# Patient Record
Sex: MALE | Race: WHITE | Employment: FULL TIME | URBAN - METROPOLITAN AREA
[De-identification: names, ages, dates, MRNs, and addresses within clinical notes are randomized per-mention and may not be internally consistent; named-entity substitution may affect disease eponyms.]

---

## 2022-08-17 ENCOUNTER — TELEPHONE (OUTPATIENT)
Dept: ORTHOPEDIC SURGERY | Age: 82
End: 2022-08-17

## 2022-08-17 NOTE — TELEPHONE ENCOUNTER
notes  were received  by  fax  and  given  to Saud Rabago  and  she  will send  to  [facility]  for  review

## 2022-09-13 ENCOUNTER — OFFICE VISIT (OUTPATIENT)
Dept: CARDIOLOGY CLINIC | Age: 82
End: 2022-09-13
Payer: MEDICARE

## 2022-09-13 VITALS
BODY MASS INDEX: 26.55 KG/M2 | HEART RATE: 58 BPM | SYSTOLIC BLOOD PRESSURE: 108 MMHG | WEIGHT: 196 LBS | HEIGHT: 72 IN | DIASTOLIC BLOOD PRESSURE: 64 MMHG

## 2022-09-13 DIAGNOSIS — I25.10 CORONARY ARTERY DISEASE DUE TO LIPID RICH PLAQUE: ICD-10-CM

## 2022-09-13 DIAGNOSIS — E78.5 DYSLIPIDEMIA: ICD-10-CM

## 2022-09-13 DIAGNOSIS — I10 ESSENTIAL HYPERTENSION: Primary | ICD-10-CM

## 2022-09-13 DIAGNOSIS — I25.83 CORONARY ARTERY DISEASE DUE TO LIPID RICH PLAQUE: ICD-10-CM

## 2022-09-13 PROCEDURE — G8417 CALC BMI ABV UP PARAM F/U: HCPCS | Performed by: INTERNAL MEDICINE

## 2022-09-13 PROCEDURE — G8428 CUR MEDS NOT DOCUMENT: HCPCS | Performed by: INTERNAL MEDICINE

## 2022-09-13 PROCEDURE — 1123F ACP DISCUSS/DSCN MKR DOCD: CPT | Performed by: INTERNAL MEDICINE

## 2022-09-13 PROCEDURE — 93000 ELECTROCARDIOGRAM COMPLETE: CPT | Performed by: INTERNAL MEDICINE

## 2022-09-13 PROCEDURE — 1036F TOBACCO NON-USER: CPT | Performed by: INTERNAL MEDICINE

## 2022-09-13 PROCEDURE — 99205 OFFICE O/P NEW HI 60 MIN: CPT | Performed by: INTERNAL MEDICINE

## 2022-09-13 RX ORDER — CHLORAL HYDRATE 500 MG
CAPSULE ORAL
COMMUNITY

## 2022-09-13 RX ORDER — ROSUVASTATIN CALCIUM 20 MG/1
20 TABLET, COATED ORAL DAILY
Qty: 90 TABLET | Refills: 3 | Status: SHIPPED | OUTPATIENT
Start: 2022-09-13

## 2022-09-13 RX ORDER — LISINOPRIL 20 MG/1
20 TABLET ORAL EVERY EVENING
Qty: 90 TABLET | Refills: 3 | Status: SHIPPED | OUTPATIENT
Start: 2022-09-13

## 2022-09-13 RX ORDER — ROSUVASTATIN CALCIUM 20 MG/1
1 TABLET, COATED ORAL
COMMUNITY
Start: 2018-01-17 | End: 2022-09-13 | Stop reason: SDUPTHER

## 2022-09-13 RX ORDER — ALLOPURINOL 300 MG/1
300 TABLET ORAL DAILY
Qty: 90 TABLET | Refills: 3 | Status: SHIPPED | OUTPATIENT
Start: 2022-09-13

## 2022-09-13 RX ORDER — ALLOPURINOL 300 MG/1
1 TABLET ORAL
COMMUNITY
End: 2022-09-13 | Stop reason: SDUPTHER

## 2022-09-13 RX ORDER — LISINOPRIL 20 MG/1
20 TABLET ORAL EVERY EVENING
COMMUNITY
End: 2022-09-13 | Stop reason: SDUPTHER

## 2022-09-13 NOTE — PROGRESS NOTES
[address], [address] [place] [address], [address] [place] [address]  PHONE: [phone]    SUBJECTIVE: Dayna Melvin (1940) is a 80 y.o. male seen for a follow up visit regarding the following:   Specialty Problems    None    Per [facility] cardiology  HPI: Presents to establish care for coronary artery disease. This is evidenced by calcium score that was elevated going back to 1999. He brings his records. He recently moved to the area from [place]. He has never had a heart attack or revascularization procedure. He denies chest pain or shortness of breath. Feels like he notices some right leg swelling on occasion over the last 3 years. Denies any sleep issues. Wakes up rested. No hypersomnolence. EKG demonstrates sinus rhythm with nonspecific T wave abnormalities and late transition. He had an LDL cholesterol of 59 last year with normal LFTs. 9/13/22  Pt doing well. No chest pain. No palpitations. Patient denies syncope. No dyspnea. States they are taking meds. Maintains a normal level of activity for them without symptoms. No dizziness or lightheadedness. All above conditions stable. CCS was 94 approx 5 yrs ago. Past Medical History, Past Surgical History, Family history, Social History, and Medications were all reviewed with the patient today and updated as necessary. Allergies   Allergen Reactions    Benzocaine     Polymyxin B     Sulfa Antibiotics Rash     No past medical history on file. No past surgical history on file. No family history on file.    Social History     Tobacco Use    Smoking status: Never    Smokeless tobacco: Never   Substance Use Topics    Alcohol use: Not on file       Current Outpatient Medications:     vitamin D (CHOLECALCIFEROL) 25 MCG (1000 UT) TABS tablet, 1,000 Units, Disp: , Rfl:     Omega-3 Fatty Acids (FISH OIL) 1000 MG CAPS, Fish Oil CAPS   1400mg 1 tablet daily  Active, Disp: , Rfl:     allopurinol (ZYLOPRIM) 300 MG tablet, Take 1 tablet by mouth daily, Disp: 90 tablet, Rfl: 3    lisinopril (PRINIVIL;ZESTRIL) 20 MG tablet, Take 1 tablet by mouth every evening, Disp: 90 tablet, Rfl: 3    rosuvastatin (CRESTOR) 20 MG tablet, Take 1 tablet by mouth daily, Disp: 90 tablet, Rfl: 3    ROS:  Review of Systems - General ROS: negative for - chills, fatigue or fever  Hematological and Lymphatic ROS: negative for - bleeding problems, bruising or jaundice  Respiratory ROS: no cough, shortness of breath, or wheezing  Cardiovascular ROS: no chest pain or dyspnea on exertion  Gastrointestinal ROS: no abdominal pain, change in bowel habits, or black or bloody stools  Neurological ROS: no TIA or stroke symptoms  All other systems negative. Wt Readings from Last 3 Encounters:   09/13/22 196 lb (88.9 kg)     Temp Readings from Last 3 Encounters:   No data found for Temp     BP Readings from Last 3 Encounters:   09/13/22 108/64     Pulse Readings from Last 3 Encounters:   09/13/22 58       PHYSICAL EXAM:  /64   Pulse 58   Ht 6' (1.829 m)   Wt 196 lb (88.9 kg)   BMI 26.58 kg/m²   Physical Examination: General appearance - alert, well appearing, and in no distress  Mental status - alert, oriented to person, place, and time  Eyes - pupils equal and reactive, extraocular eye movements intact  Neck/lymph - supple, no significant adenopathy  Chest/lungs - clear to auscultation, no wheezes, rales or rhonchi, symmetric air entry  Heart/CV - normal rate, regular rhythm, normal S1, S2, no murmurs, rubs, clicks or gallops  Abdomen/GI - soft, nontender, nondistended, no masses or organomegaly  Musculoskeletal - no joint tenderness, deformity or swelling  Extremities - peripheral pulses normal, no pedal edema, no clubbing or cyanosis  Skin - normal coloration and turgor, no rashes, no suspicious skin lesions noted    EKG: normal EKG, normal sinus rhythm.          Medications reviewed and questions answered    No results found for this or any previous visit (from the past 672

## 2022-10-12 ENCOUNTER — OFFICE VISIT (OUTPATIENT)
Dept: ORTHOPEDIC SURGERY | Age: 82
End: 2022-10-12
Payer: MEDICARE

## 2022-10-12 VITALS — HEIGHT: 72 IN | WEIGHT: 190 LBS | BODY MASS INDEX: 25.73 KG/M2

## 2022-10-12 DIAGNOSIS — M47.816 LUMBAR SPONDYLOSIS: ICD-10-CM

## 2022-10-12 DIAGNOSIS — M51.36 DDD (DEGENERATIVE DISC DISEASE), LUMBAR: Primary | ICD-10-CM

## 2022-10-12 PROCEDURE — 99203 OFFICE O/P NEW LOW 30 MIN: CPT | Performed by: PHYSICAL MEDICINE & REHABILITATION

## 2022-10-12 PROCEDURE — 1123F ACP DISCUSS/DSCN MKR DOCD: CPT | Performed by: PHYSICAL MEDICINE & REHABILITATION

## 2022-10-12 PROCEDURE — 1036F TOBACCO NON-USER: CPT | Performed by: PHYSICAL MEDICINE & REHABILITATION

## 2022-10-12 PROCEDURE — G8484 FLU IMMUNIZE NO ADMIN: HCPCS | Performed by: PHYSICAL MEDICINE & REHABILITATION

## 2022-10-12 PROCEDURE — G8427 DOCREV CUR MEDS BY ELIG CLIN: HCPCS | Performed by: PHYSICAL MEDICINE & REHABILITATION

## 2022-10-12 PROCEDURE — G8417 CALC BMI ABV UP PARAM F/U: HCPCS | Performed by: PHYSICAL MEDICINE & REHABILITATION

## 2022-10-12 RX ORDER — ALPRAZOLAM 1 MG/1
TABLET ORAL
Qty: 1 TABLET | Refills: 0 | Status: CANCELLED | OUTPATIENT
Start: 2022-10-12 | End: 2022-11-14

## 2022-10-12 NOTE — LETTER
Freddy Doheny  1940  ______________________________________________________________________    Radiographic Studies:    Cervical MRI/ Contrast        Thoracic MRI/ Contrast        Lumbar MRI/ Contrast    CT Myelogram __________________________________________________    NCS/EMG______________________________________________________    MRI of ________________________________________________________    Other__________________________________________________________      Injections:    _______________________________________________________________    Authorization to stop blood thinners________________________________      Medications:    Oral steroids___________________    Muscle Relaxers___________________    Pain medications_____________________    NSAIDS_____________________    Neuropathic pain medication_________________________________________      Physical Therapy:    Lumbar     Thoracic     Cervical       Other_______________________________      Follow up/ Referral:    Pain referral_______________________________________________________    Referral___________________________________________________________    Follow up_________________________________________________________    Handicapped Parking_______________________________________________    Other_____________________________________________________________

## 2022-10-12 NOTE — PROGRESS NOTES
[id number] [facility]  Consultation Note    Patient ID:  Name: Altagracia Pate  MRN: [id number]  AGE: 80 y.o.  : 1940    Date of Consultation:  2022    CC:   Chief Complaint   Patient presents with    Back Pain         HPI:  Mr. Robi Vences is an 80-year-old man who presents today for evaluation of low back pain. He has a longstanding history of pain in the lower back. Previously, he has had some pain in the upper lumbar area, but the pain is now in the lower back. It is central and bilateral.  It does not radiate to the legs or feet. It awakens him at night. The pain is aggravated with lifting or getting out of bed. The pain is associate with paresthesias in the right anteromedial calf. There are no alleviating factors. He reports the pain is a baseline 4-5/10 in severity, but it gets much more severe when he gets out of bed or tries to lift anything. The pain significantly affects his daily functioning. When he was out of state, he had sacroiliac joint injections . He also underwent medial branch nerve blocks in the lower lumbar spine, followed by radiofrequency ablations 2021. He also had upper lumbar medial branch nerve blocks 2021. He brings with him today an MRI of the lumbar spine from 2020 that was ordered by another provider. I reviewed these images today. He had multilevel degenerative change. There is some right L4-5-S1 neuroforaminal narrowing. There is no significant central stenosis. He recently had x-rays of the cervical, thoracic, and lumbar spine ordered by another provider and performed May 17, 2022. I do not have those films for review. They reportedly showed multilevel degenerative change of the cervical, thoracic, and lumbar spine with left hip replacement. Past Medical History Includes: No past medical history on file., No past surgical history on file. Family History: No family history on file.    Social History: Social History     Tobacco Use    Smoking status: Never    Smokeless tobacco: Never   Substance Use Topics    Alcohol use: Not on file       ALLERGIES:   Allergies   Allergen Reactions    Benzocaine     Polymyxin B     Sulfa Antibiotics Rash        Patient Medications    Current Outpatient Medications   Medication Sig    vitamin D (CHOLECALCIFEROL) 25 MCG (1000 UT) TABS tablet 1,000 Units    Omega-3 Fatty Acids (FISH OIL) 1000 MG CAPS Fish Oil CAPS   1400mg 1 tablet daily  Active    allopurinol (ZYLOPRIM) 300 MG tablet Take 1 tablet by mouth daily    lisinopril (PRINIVIL;ZESTRIL) 20 MG tablet Take 1 tablet by mouth every evening    rosuvastatin (CRESTOR) 20 MG tablet Take 1 tablet by mouth daily     No current facility-administered medications for this visit. ROS/Meds/PSH/PMH/FH/SH: I personally reviewed the patients standard intake form. Physical Exam:      Lumbar: PE: General: Awake, alert, no distress. HEENT: Mucous membranes moist and pink. Elsa Kennedy Psych: Appropriate and conversant. Derm: No rash Gait: Unassisted, non-ataxic MS: Straight leg raise negative bilaterally. No pain with hip internal or external rotation. No pain with resisted hip flexion bilaterally. No pain with lumbar flexion. He has pain with lumbar extension. He has ipsilateral low back pain with bilateral lumbar facet load. He has tenderness to palpation over the lower lumbar spine and paraspinals. Strength is 5/5 and symmetric in the bilateral lower extremities. No foot drop. Neurological: Sensation to light touch is intact in the bilateral lower extremities. Reflexes are trace and symmetric in the bilateral lower extremities. VITALS: @IPVITALS(8:)@ , [id number]       No flowsheet data found. No results found for any visits on 10/12/22. Assessment and Plan:     ICD-10-CM    1. DDD (degenerative disc disease), lumbar  M51.36 MRI LUMBAR SPINE WO CONTRAST     CANCELED: MRI LUMBAR SPINE WO CONTRAST      2. Lumbar spondylosis  M47.816           Orders Placed This Encounter   Procedures    MRI LUMBAR SPINE WO CONTRAST     Standing Status:   Future     Standing Expiration Date:   10/12/2023     Order Specific Question:   Reason for exam:     Answer:   lumbar spine pain.  Compare to 2020 lumbar study        4   This is a chronic illness/condition with exacerbation and progression  Treatment at this time:   Studies ordered today: MRI      Sonia Cheung MD  10/12/2022,  5:31 PM

## 2022-11-04 ENCOUNTER — TELEPHONE (OUTPATIENT)
Dept: ORTHOPEDIC SURGERY | Age: 82
End: 2022-11-04

## 2022-12-01 NOTE — PROGRESS NOTES
[facility]  Consultation Note  Virtual/Telephone Visit     Patient ID:  Name: Dariela Mooney  MRN: [id number]  AGE: 80 y.o.  : 1940    Date of Consultation:  2022    CC:   Chief Complaint   Patient presents with    Back Pain         HPI:  Today's visit was performed through a telephone visit with live audio feed. The patient was at home in [place]. I was in the office. No other persons were present. Verbal informed consent was obtained prior to today's visit, which lasted 18 minutes and included evaluation and management of symptoms, review of imaging, review of previous treatments, and discussion of treatment options. Please see the note below for more detailed information regarding today's visit. Mr. Yuan Torres is an 80-year-old man who presents today for follow-up of low back pain. He continues to have pain in the central and bilateral lower back. It does not radiate to the legs or feet. It has responded well in the past to radiofrequency ablation of the lower lumbar facet joints, performed out of state 2021, prior to his moving to the area. MRI lumbar spine performed 2022. He had multilevel degenerative change with moderate right L4-5-S1 neuroforaminal narrowing. We discussed these findings at length today. Additionally, he had marrow signal heterogeneity, stable compared to the prior MRI from . He also had multifocal renal cysts, previously evaluated with CT of the abdomen and pelvis 2022. Past Medical History Includes: No past medical history on file., No past surgical history on file. Family History: No family history on file.    Social History:   Social History     Tobacco Use    Smoking status: Never    Smokeless tobacco: Never   Substance Use Topics    Alcohol use: Not on file       ALLERGIES:   Allergies   Allergen Reactions    Benzocaine     Polymyxin B     Sulfa Antibiotics Rash        Patient Medications Current Outpatient Medications   Medication Sig    diazePAM (VALIUM) 10 MG tablet TAKE ONE TABLET BY MOUTH ONE HOUR PRIOR TO PROCEDURE. vitamin D (CHOLECALCIFEROL) 25 MCG (1000 UT) TABS tablet 1,000 Units    Omega-3 Fatty Acids (FISH OIL) 1000 MG CAPS Fish Oil CAPS   1400mg 1 tablet daily  Active    allopurinol (ZYLOPRIM) 300 MG tablet Take 1 tablet by mouth daily    lisinopril (PRINIVIL;ZESTRIL) 20 MG tablet Take 1 tablet by mouth every evening    rosuvastatin (CRESTOR) 20 MG tablet Take 1 tablet by mouth daily     No current facility-administered medications for this visit. ROS/Meds/PSH/PMH/FH/SH: I personally reviewed the patients standard intake form. No flowsheet data found. No results found for any visits on 12/02/22. Assessment and Plan:     ICD-10-CM    1. Lumbar spondylosis  M47.816 Injection Authorization - Spine      2. Disc degeneration, lumbar  M51.36 Injection Authorization - Spine     diazePAM (VALIUM) 10 MG tablet          Orders Placed This Encounter   Procedures    Injection Authorization - Spine     Referral Priority:   Routine     Number of Visits Requested:   1          4   This is a chronic illness/condition with exacerbation and progression  Treatment at this time: Discussed Injection therapy at length today, including risks, complications and alternative treatment options. The patient wishes to proceed. The injection will be performed as bilateral L4-5-S1 facet joint radiofrequency denervation. Studies ordered today: none    On this date 12/2/2022 I have spent 18 minutes reviewing previous notes, test results and face to face with the patient discussing the diagnosis and importance of compliance with the treatment plan as well as documenting on the day of the visit. [employer], was evaluated through a synchronous (real-time) audio-video encounter. The patient (or guardian if applicable) is aware that this is a billable service.  Verbal consent to proceed has been obtained within the past 12 months. The visit was conducted pursuant to the emergency declaration under the [address] [place] [address] and the [employer] General Act. Patient identification was verified, and a caregiver was present when appropriate. The patient was located in a state where the provider was credentialed to provide care.        Meño Connors MD  12/2/2022,  11:32 AM

## 2022-12-02 ENCOUNTER — TELEMEDICINE (OUTPATIENT)
Dept: ORTHOPEDIC SURGERY | Age: 82
End: 2022-12-02

## 2022-12-02 DIAGNOSIS — M51.36 DISC DEGENERATION, LUMBAR: ICD-10-CM

## 2022-12-02 DIAGNOSIS — M47.816 LUMBAR SPONDYLOSIS: Primary | ICD-10-CM

## 2022-12-02 RX ORDER — DIAZEPAM 10 MG/1
TABLET ORAL
Qty: 2 TABLET | Refills: 0 | Status: SHIPPED | OUTPATIENT
Start: 2022-12-02 | End: 2022-12-30

## 2022-12-02 NOTE — LETTER
Ely Amy  1940  ______________________________________________________________________    Radiographic Studies:    Cervical MRI/ Contrast        Thoracic MRI/ Contrast        Lumbar MRI/ Contrast    CT Myelogram __________________________________________________    NCS/EMG______________________________________________________    MRI of ________________________________________________________    Other__________________________________________________________      Injections:    _______________________________________________________________    Authorization to stop blood thinners________________________________      Medications:    Oral steroids___________________    Muscle Relaxers___________________    Pain medications_____________________    NSAIDS_____________________    Neuropathic pain medication_________________________________________      Physical Therapy:    Lumbar     Thoracic     Cervical       Other_______________________________      Follow up/ Referral:    Pain referral_______________________________________________________    Referral___________________________________________________________    Follow up_________________________________________________________    Handicapped Parking_______________________________________________    Other_____________________________________________________________

## 2022-12-12 ENCOUNTER — OFFICE VISIT (OUTPATIENT)
Dept: ORTHOPEDIC SURGERY | Age: 82
End: 2022-12-12
Payer: MEDICARE

## 2022-12-12 DIAGNOSIS — M47.816 LUMBAR SPONDYLOSIS: Primary | ICD-10-CM

## 2022-12-12 DIAGNOSIS — M51.36 DDD (DEGENERATIVE DISC DISEASE), LUMBAR: ICD-10-CM

## 2022-12-12 DIAGNOSIS — M51.36 DISC DEGENERATION, LUMBAR: ICD-10-CM

## 2022-12-12 PROCEDURE — 64636 DESTROY L/S FACET JNT ADDL: CPT | Performed by: PHYSICAL MEDICINE & REHABILITATION

## 2022-12-12 PROCEDURE — 64635 DESTROY LUMB/SAC FACET JNT: CPT | Performed by: PHYSICAL MEDICINE & REHABILITATION

## 2022-12-12 RX ORDER — TRIAMCINOLONE ACETONIDE 40 MG/ML
40 INJECTION, SUSPENSION INTRA-ARTICULAR; INTRAMUSCULAR ONCE
Status: COMPLETED | OUTPATIENT
Start: 2022-12-12 | End: 2022-12-12

## 2022-12-12 RX ADMIN — TRIAMCINOLONE ACETONIDE 40 MG: 40 INJECTION, SUSPENSION INTRA-ARTICULAR; INTRAMUSCULAR at 14:18

## 2022-12-12 NOTE — PROGRESS NOTES
Name: Michael Arellano  YOB: 1940  Gender: male  MRN: [id number]    PROCEDURE: Left  L4, L5, and S1 medial branch nerve/facet joint radiofrequency denervation     Precautions:  Michael Arellano denies prior sensitivity to steroid, local anesthetic, iodine, or shellfish. The procedure was discussed at length with the patient and informed consent was signed and placed in the chart. He had great relief of his usual low back pain from previous lumbar facet joint injections. The patient was placed in a prone position on the fluoroscopy table and the skin was prepped and draped in a routine sterile fashion with Hibaclens. The area where the medial branch nerve lies for Left L4 was identified under fluoroscopy, and the area to be injected was anesthetized with 1 mL of 1% Lidocaine. A 20 gauge 10 cm Neurotherm radiofrequency needle was carefully advanced under fluoroscopic guidance to area where the medial branch nerve for Left L4 lies. Bony contact was made. Testing for motor and sensory was negative into the lower extremities and groin to 3 volts. Motor onset was 0.4 volts. Aspiration was negative. An injectate of 1 mL of 0.25% marcaine was injected. Lesioning was carried out for 60 seconds at 80 degrees celsius. The probe was then rotated 180 degrees, and lesioning was repeated. The above procedure was repeated at the levels of the Left  L5 and left S1 medial branch nerves. At the level of Left  L5 medial branch nerve, motor onset was 0.4 volts. Testing for motor and sensory was negative into the lower extremities and groin to 3.0 volts at Left L5 medial branch nerve. At the level of Left S1 medial branch nerve, motor onset was 0.5 volts. Testing for motor and sensory was negative into the lower extremities and groin to 3.0 volts at Left  S1 medial branch nerve. Following lesioning of all 3 nerves, an injectate of 0.25 mL of kenalog 40 mg/ml was injected at each level.  He tolerated the procedure well. There were no complications. He was stable and ambulatory at discharge. Fluoroscopic guidance was used intermittently over a 50-minute period to insure proper needle placement and patient safety. Procedural Diagnosis: @    ICD-10-CM    1. Lumbar spondylosis  M47.816 XR DESTR PARAVERTBRL LUMB/SAC ADD LV W S&I     XR DESTR PARAVERTBRL LUMB/SAC W S&I     triamcinolone acetonide (KENALOG-40) injection 40 mg      2. Disc degeneration, lumbar  M51.36 XR DESTR PARAVERTBRL LUMB/SAC ADD LV W S&I     XR DESTR PARAVERTBRL LUMB/SAC W S&I     triamcinolone acetonide (KENALOG-40) injection 40 mg      3.  DDD (degenerative disc disease), lumbar  M51.36 XR DESTR PARAVERTBRL LUMB/SAC ADD LV W S&I     XR DESTR PARAVERTBRL LUMB/SAC W S&I     triamcinolone acetonide (KENALOG-40) injection 40 mg          Edd Cantu MD

## 2022-12-14 ENCOUNTER — OFFICE VISIT (OUTPATIENT)
Dept: ORTHOPEDIC SURGERY | Age: 82
End: 2022-12-14
Payer: MEDICARE

## 2022-12-14 DIAGNOSIS — M51.36 DISC DEGENERATION, LUMBAR: ICD-10-CM

## 2022-12-14 DIAGNOSIS — M47.816 LUMBAR SPONDYLOSIS: Primary | ICD-10-CM

## 2022-12-14 PROCEDURE — 64635 DESTROY LUMB/SAC FACET JNT: CPT | Performed by: PHYSICAL MEDICINE & REHABILITATION

## 2022-12-14 PROCEDURE — 64636 DESTROY L/S FACET JNT ADDL: CPT | Performed by: PHYSICAL MEDICINE & REHABILITATION

## 2022-12-14 RX ORDER — TRIAMCINOLONE ACETONIDE 40 MG/ML
40 INJECTION, SUSPENSION INTRA-ARTICULAR; INTRAMUSCULAR ONCE
Status: COMPLETED | OUTPATIENT
Start: 2022-12-14 | End: 2022-12-14

## 2022-12-14 RX ADMIN — TRIAMCINOLONE ACETONIDE 40 MG: 40 INJECTION, SUSPENSION INTRA-ARTICULAR; INTRAMUSCULAR at 14:09

## 2022-12-14 NOTE — PROGRESS NOTES
Name: Judi Barbosa  YOB: 1940  Gender: male  MRN: [id number]    PROCEDURE: Right  L4, L5, and S1 medial branch nerve/facet joint radiofrequency denervation     Precautions:  Judi Barbosa denies prior sensitivity to steroid, local anesthetic, iodine, or shellfish. The procedure was discussed at length with the patient and informed consent was signed and placed in the chart. He had great relief of his usual low back pain from previous lumbar facet joint injections. The patient was placed in a prone position on the fluoroscopy table and the skin was prepped and draped in a routine sterile fashion with Hibaclens. The area where the medial branch nerve lies for Right L4 was identified under fluoroscopy, and the area to be injected was anesthetized with 1 mL of 1% Lidocaine. A 20 gauge 10 cm Neurotheru.sit radiofrequency needle was carefully advanced under fluoroscopic guidance to area where the medial branch nerve for Right L4 lies. Bony contact was made. Testing for motor and sensory was negative into the lower extremities and groin to 3 volts. Motor onset was 0.4 volts. Aspiration was negative. An injectate of 4 mL of 0.25% marcaine was injected. Lesioning was carried out for 60 seconds at 80 degrees celsius. The probe was then rotated 180 degrees, and lesioning was repeated. The above procedure was repeated at the levels of the Right  L5 and right S1 medial branch nerves. At the level of Right  L5 medial branch nerve, motor onset was 0.3 volts. Testing for motor and sensory was negative into the lower extremities and groin to 3.0 volts at Right L5 medial branch nerve. At the level of Right S1 medial branch nerve, motor onset was 0.4 volts. Testing for motor and sensory was negative into the lower extremities and groin to 3.0 volts at Right  S1 medial branch nerve. Following lesioning of all 3 nerves, an injectate of 0.25 mL of 40 mg/mL kenalog was injected at each level. He tolerated the procedure well. There were no complications. He was stable and ambulatory at discharge. Fluoroscopic guidance was used intermittently over a 50-minute period to insure proper needle placement and patient safety. Procedural Diagnosis: @    ICD-10-CM    1. Lumbar spondylosis  M47.816 XR DESTR PARAVERTBRL LUMB/SAC W S&I     XR DESTR PARAVERTBRL LUMB/SAC ADD LV W S&I     triamcinolone acetonide (KENALOG-40) injection 40 mg      2.  Disc degeneration, lumbar  M51.36 XR DESTR PARAVERTBRL LUMB/SAC W S&I     XR DESTR PARAVERTBRL LUMB/SAC ADD LV W S&I     triamcinolone acetonide (KENALOG-40) injection 40 mg          Cash Crouch MD

## 2023-03-16 ENCOUNTER — OFFICE VISIT (OUTPATIENT)
Dept: CARDIOLOGY CLINIC | Age: 83
End: 2023-03-16
Payer: MEDICARE

## 2023-03-16 VITALS
DIASTOLIC BLOOD PRESSURE: 76 MMHG | WEIGHT: 196 LBS | HEART RATE: 52 BPM | BODY MASS INDEX: 26.55 KG/M2 | HEIGHT: 72 IN | SYSTOLIC BLOOD PRESSURE: 138 MMHG

## 2023-03-16 DIAGNOSIS — I10 ESSENTIAL HYPERTENSION: Primary | ICD-10-CM

## 2023-03-16 DIAGNOSIS — E78.5 DYSLIPIDEMIA: ICD-10-CM

## 2023-03-16 DIAGNOSIS — I25.83 CORONARY ARTERY DISEASE DUE TO LIPID RICH PLAQUE: ICD-10-CM

## 2023-03-16 DIAGNOSIS — I25.10 CORONARY ARTERY DISEASE DUE TO LIPID RICH PLAQUE: ICD-10-CM

## 2023-03-16 PROCEDURE — 1123F ACP DISCUSS/DSCN MKR DOCD: CPT | Performed by: INTERNAL MEDICINE

## 2023-03-16 PROCEDURE — 3075F SYST BP GE 130 - 139MM HG: CPT | Performed by: INTERNAL MEDICINE

## 2023-03-16 PROCEDURE — 3078F DIAST BP <80 MM HG: CPT | Performed by: INTERNAL MEDICINE

## 2023-03-16 PROCEDURE — G8428 CUR MEDS NOT DOCUMENT: HCPCS | Performed by: INTERNAL MEDICINE

## 2023-03-16 PROCEDURE — G8484 FLU IMMUNIZE NO ADMIN: HCPCS | Performed by: INTERNAL MEDICINE

## 2023-03-16 PROCEDURE — 1036F TOBACCO NON-USER: CPT | Performed by: INTERNAL MEDICINE

## 2023-03-16 PROCEDURE — G8417 CALC BMI ABV UP PARAM F/U: HCPCS | Performed by: INTERNAL MEDICINE

## 2023-03-16 PROCEDURE — 99214 OFFICE O/P EST MOD 30 MIN: CPT | Performed by: INTERNAL MEDICINE

## 2023-03-16 NOTE — PROGRESS NOTES
[address], [address] [place] [address], [address] [place] [address]  PHONE: [phone]    SUBJECTIVE: Chucky Grigsby (1940) is a 80 y.o. male seen for a follow up visit regarding the following:   Specialty Problems    None    Per [facility] cardiology  HPI: Presents to establish care for coronary artery disease. This is evidenced by calcium score that was elevated going back to 1999. He brings his records. He recently moved to the area from [place]. He has never had a heart attack or revascularization procedure. He denies chest pain or shortness of breath. Feels like he notices some right leg swelling on occasion over the last 3 years. Denies any sleep issues. Wakes up rested. No hypersomnolence. EKG demonstrates sinus rhythm with nonspecific T wave abnormalities and late transition. He had an LDL cholesterol of 59 last year with normal LFTs. 9/13/22  Pt doing well. No chest pain. No palpitations. Patient denies syncope. No dyspnea. States they are taking meds. Maintains a normal level of activity for them without symptoms. No dizziness or lightheadedness. All above conditions stable. CCS was 94 approx 5 yrs ago. 3/16/23  Pt doing well. No chest pain. No palpitations. Patient denies syncope. No dyspnea. States they are taking meds. Maintains a normal level of activity for them without symptoms. No dizziness or lightheadedness. All above conditions stable. Past Medical History, Past Surgical History, Family history, Social History, and Medications were all reviewed with the patient today and updated as necessary. Allergies   Allergen Reactions    Benzocaine     Polymyxin B     Sulfa Antibiotics Rash     No past medical history on file. No past surgical history on file. No family history on file.    Social History     Tobacco Use    Smoking status: Never    Smokeless tobacco: Never   Substance Use Topics    Alcohol use: Not on file       Current Outpatient Medications:     vitamin D (CHOLECALCIFEROL) 25 MCG (1000 UT) TABS tablet, 1,000 Units, Disp: , Rfl:     Omega-3 Fatty Acids (FISH OIL) 1000 MG CAPS, Fish Oil CAPS   1400mg 1 tablet daily  Active, Disp: , Rfl:     allopurinol (ZYLOPRIM) 300 MG tablet, Take 1 tablet by mouth daily, Disp: 90 tablet, Rfl: 3    lisinopril (PRINIVIL;ZESTRIL) 20 MG tablet, Take 1 tablet by mouth every evening, Disp: 90 tablet, Rfl: 3    rosuvastatin (CRESTOR) 20 MG tablet, Take 1 tablet by mouth daily, Disp: 90 tablet, Rfl: 3    ROS:  Review of Systems - General ROS: negative for - chills, fatigue or fever  Hematological and Lymphatic ROS: negative for - bleeding problems, bruising or jaundice  Respiratory ROS: no cough, shortness of breath, or wheezing  Cardiovascular ROS: no chest pain or dyspnea on exertion  Gastrointestinal ROS: no abdominal pain, change in bowel habits, or black or bloody stools  Neurological ROS: no TIA or stroke symptoms  All other systems negative.     Wt Readings from Last 3 Encounters:   03/16/23 196 lb (88.9 kg)   10/12/22 190 lb (86.2 kg)   09/13/22 196 lb (88.9 kg)     Temp Readings from Last 3 Encounters:   No data found for Temp     BP Readings from Last 3 Encounters:   03/16/23 138/76   09/13/22 108/64     Pulse Readings from Last 3 Encounters:   03/16/23 52   09/13/22 58       PHYSICAL EXAM:  /76   Pulse 52   Ht 6' (1.829 m)   Wt 196 lb (88.9 kg)   BMI 26.58 kg/m²   Physical Examination: General appearance - alert, well appearing, and in no distress  Mental status - alert, oriented to person, place, and time  Eyes - pupils equal and reactive, extraocular eye movements intact  Neck/lymph - supple, no significant adenopathy  Chest/lungs - clear to auscultation, no wheezes, rales or rhonchi, symmetric air entry  Heart/CV - normal rate, regular rhythm, normal S1, S2, no murmurs, rubs, clicks or gallops  Abdomen/GI - soft, nontender, nondistended, no masses or organomegaly  Musculoskeletal - no joint tenderness, deformity or swelling  Extremities - peripheral pulses normal, no pedal edema, no clubbing or cyanosis  Skin - normal coloration and turgor, no rashes, no suspicious skin lesions noted    EKG: normal EKG, normal sinus rhythm. Medications reviewed and questions answered    No results found for this or any previous visit (from the past 672 hour(s)). No results found for: CHOL, CHOLPOCT, CHOLX, CHLST, CHOLV, HDL, HDLPOC, HDLC, LDL, LDLC, VLDLC, VLDL, TGLX, TRIGL    ASSESSMENT and PLAN  No follow-up provider specified. is for   Specialty Problems    None       PLAN:  Problem List Items Addressed This Visit    None  Visit Diagnoses       Essential hypertension    -  Primary    Dyslipidemia        Coronary artery disease due to lipid rich plaque               Cad  Well managed on current therapy. Htn  Well controlled on meds. Continue meds    Lipids  Well managed continue statin. Continue meds as below    Current Outpatient Medications:     vitamin D (CHOLECALCIFEROL) 25 MCG (1000 UT) TABS tablet, 1,000 Units, Disp: , Rfl:     Omega-3 Fatty Acids (FISH OIL) 1000 MG CAPS, Fish Oil CAPS   1400mg 1 tablet daily  Active, Disp: , Rfl:     allopurinol (ZYLOPRIM) 300 MG tablet, Take 1 tablet by mouth daily, Disp: 90 tablet, Rfl: 3    lisinopril (PRINIVIL;ZESTRIL) 20 MG tablet, Take 1 tablet by mouth every evening, Disp: 90 tablet, Rfl: 3    rosuvastatin (CRESTOR) 20 MG tablet, Take 1 tablet by mouth daily, Disp: 90 tablet, Rfl: 3    Gerardo Potts MD  3/16/2023  2:17 PM    Pt is instructed to follow all appropriate cardiac risk factor recommendations and to be compliant with meds and testing. Instructed to follow up appropriately and seek urgent medical care if acute or unstable issues arise.  Results of some tests may be viewed thru [address] but this does not substitute for follow up with MD. If follow up is not scheduled pt is instructed to call for follow up

## 2023-11-10 NOTE — PROGRESS NOTES
[address] [facility]  Consultation Note    Patient ID:  Name: Sherlyn Messer  MRN: [id number]  AGE: 80 y.o.  : 1940    Date of Consultation:  2023    CC:   Chief Complaint   Patient presents with    Back Pain         HPI:  Mr. Justyna Mcclelland is an 80-year-old man who presents today for follow-up of low back pain. He did well with bilateral L4-5-S1 facet joint radiofrequency denervation 2022. He was doing well until about 1-1/2 months ago, when the pain returned. It feels like the same pain he previously had. The pain is in the central and bilateral low back. It does not radiate to the legs or feet. It is aggravated with lifting, sitting, lying down. Symptoms are alleviated with moving around some. There are no lower extremity paresthesias. The pain is associated with nighttime awakening. The pain is aggravated in the morning. MRI lumbar spine 2022 showed degenerative disc changes, spondylosis, facet arthropathy, right L4-5-S1 neuroforaminal narrowing. Past Medical History Includes: No past medical history on file., No past surgical history on file. Family History: No family history on file. Social History:   Social History     Tobacco Use    Smoking status: Never    Smokeless tobacco: Never   Substance Use Topics    Alcohol use: Not on file       ALLERGIES:   Allergies   Allergen Reactions    Benzocaine     Polymyxin B     Sulfa Antibiotics Rash        Patient Medications    Current Outpatient Medications   Medication Sig    traMADol (ULTRAM) 50 MG tablet Take 1 tablet by mouth 3 times daily as needed for Pain for up to 10 days. Max Daily Amount: 150 mg    diazePAM (VALIUM) 5 MG tablet Take 2 tablets by mouth as needed (1 hour before procedure) for up to 2 doses.     vitamin D (CHOLECALCIFEROL) 25 MCG (1000 UT) TABS tablet 1,000 Units    Omega-3 Fatty Acids (FISH OIL) 1000 MG CAPS Fish Oil CAPS   1400mg 1 tablet daily  Active    allopurinol (ZYLOPRIM) 300

## 2023-11-14 ENCOUNTER — OFFICE VISIT (OUTPATIENT)
Dept: ORTHOPEDIC SURGERY | Age: 83
End: 2023-11-14
Payer: MEDICARE

## 2023-11-14 ENCOUNTER — TELEPHONE (OUTPATIENT)
Dept: ORTHOPEDIC SURGERY | Age: 83
End: 2023-11-14

## 2023-11-14 VITALS — HEIGHT: 72 IN | BODY MASS INDEX: 25.87 KG/M2 | WEIGHT: 191 LBS

## 2023-11-14 DIAGNOSIS — M47.816 LUMBAR SPONDYLOSIS: Primary | ICD-10-CM

## 2023-11-14 DIAGNOSIS — M51.36 DDD (DEGENERATIVE DISC DISEASE), LUMBAR: ICD-10-CM

## 2023-11-14 PROCEDURE — 1123F ACP DISCUSS/DSCN MKR DOCD: CPT | Performed by: PHYSICAL MEDICINE & REHABILITATION

## 2023-11-14 PROCEDURE — 1036F TOBACCO NON-USER: CPT | Performed by: PHYSICAL MEDICINE & REHABILITATION

## 2023-11-14 PROCEDURE — 99214 OFFICE O/P EST MOD 30 MIN: CPT | Performed by: PHYSICAL MEDICINE & REHABILITATION

## 2023-11-14 PROCEDURE — G8417 CALC BMI ABV UP PARAM F/U: HCPCS | Performed by: PHYSICAL MEDICINE & REHABILITATION

## 2023-11-14 PROCEDURE — G8484 FLU IMMUNIZE NO ADMIN: HCPCS | Performed by: PHYSICAL MEDICINE & REHABILITATION

## 2023-11-14 PROCEDURE — G8427 DOCREV CUR MEDS BY ELIG CLIN: HCPCS | Performed by: PHYSICAL MEDICINE & REHABILITATION

## 2023-11-14 RX ORDER — TRAMADOL HYDROCHLORIDE 50 MG/1
50 TABLET ORAL 3 TIMES DAILY PRN
Qty: 30 TABLET | Refills: 0 | Status: SHIPPED | OUTPATIENT
Start: 2023-11-14 | End: 2023-11-24

## 2023-11-14 RX ORDER — DIAZEPAM 5 MG/1
10 TABLET ORAL PRN
Qty: 2 TABLET | Refills: 0 | Status: SHIPPED | OUTPATIENT
Start: 2023-11-14 | End: 2023-11-28

## 2023-11-14 NOTE — TELEPHONE ENCOUNTER
I returned call to patient in regards to rescheduling his injection appointments. A message was left for a return call to the office.

## 2023-11-15 ENCOUNTER — TELEPHONE (OUTPATIENT)
Dept: ORTHOPEDIC SURGERY | Age: 83
End: 2023-11-15

## 2023-11-15 NOTE — TELEPHONE ENCOUNTER
Is it ok that this patient is scheduled for back injections on these days, and do both have to be scheduled at [facility]?

## 2023-12-07 ENCOUNTER — OFFICE VISIT (OUTPATIENT)
Dept: ORTHOPEDIC SURGERY | Age: 83
End: 2023-12-07
Payer: MEDICARE

## 2023-12-07 DIAGNOSIS — M47.816 LUMBAR SPONDYLOSIS: Primary | ICD-10-CM

## 2023-12-07 DIAGNOSIS — M47.816 LUMBAR FACET ARTHROPATHY: ICD-10-CM

## 2023-12-07 PROCEDURE — 64635 DESTROY LUMB/SAC FACET JNT: CPT | Performed by: PHYSICAL MEDICINE & REHABILITATION

## 2023-12-07 PROCEDURE — 64636 DESTROY L/S FACET JNT ADDL: CPT | Performed by: PHYSICAL MEDICINE & REHABILITATION

## 2023-12-07 RX ORDER — TRIAMCINOLONE ACETONIDE 40 MG/ML
40 INJECTION, SUSPENSION INTRA-ARTICULAR; INTRAMUSCULAR ONCE
Status: COMPLETED | OUTPATIENT
Start: 2023-12-07 | End: 2023-12-07

## 2023-12-07 RX ADMIN — TRIAMCINOLONE ACETONIDE 40 MG: 40 INJECTION, SUSPENSION INTRA-ARTICULAR; INTRAMUSCULAR at 14:36

## 2023-12-07 NOTE — PROGRESS NOTES
Name: Ema Ambrocio  YOB: 1940  Gender: male  MRN: [id number]    PROCEDURE: Right  L4, L5, and S1 medial branch nerve/facet joint radiofrequency denervation     Precautions:  Ema Ambrocio denies prior sensitivity to steroid, local anesthetic, iodine, or shellfish. The procedure was discussed at length with the patient and informed consent was signed and placed in the chart. He had great relief of his usual low back pain from previous L4-L5 and L5-S1 bilateral  facet joint injections. The patient was placed in a prone position on the fluoroscopy table and the skin was prepped and draped in a routine sterile fashion with Hibaclens. The area where the medial branch nerve lies for Right L4 was identified under fluoroscopy, and the area to be injected was anesthetized with 1 mL of 1% Lidocaine. A 20 gauge 10 cm Neurotherm radiofrequency needle was carefully advanced under fluoroscopic guidance to area where the medial branch nerve for Right L4 lies. Bony contact was made. Testing for motor and sensory was negative into the lower extremities and groin to 3 volts. Motor onset was 0.3 volts. Aspiration was negative. An injectate of 1 mL of 0.25% marcaine was injected. Lesioning was carried out for 60 seconds at 80 degrees celsius. The probe was then rotated 180 degrees, and lesioning was repeated. The above procedure was repeated at the levels of the Right  L5 and S1 medial branch nerves. At the level of Right  L5 medial branch nerve, motor onset was 0.3 volts. Testing for motor and sensory was negative into the lower extremities and groin to 3.0 volts at Right L5 medial branch nerve. At the level of Right S1 medial branch nerve, motor onset was 0.4 volts. Testing for motor and sensory was negative into the lower extremities and groin to 3.0 volts at Right  S1 medial branch nerve. Following lesioning of all 3 nerves, 0.25 mL of kenalog 40 mg/mL was injected at each level.

## 2024-01-04 ENCOUNTER — OFFICE VISIT (OUTPATIENT)
Dept: ORTHOPEDIC SURGERY | Age: 84
End: 2024-01-04
Payer: MEDICARE

## 2024-01-04 DIAGNOSIS — M47.816 LUMBAR SPONDYLOSIS: Primary | ICD-10-CM

## 2024-01-04 DIAGNOSIS — M47.816 LUMBAR FACET ARTHROPATHY: ICD-10-CM

## 2024-01-04 DIAGNOSIS — M51.36 DDD (DEGENERATIVE DISC DISEASE), LUMBAR: ICD-10-CM

## 2024-01-04 PROCEDURE — 64636 DESTROY L/S FACET JNT ADDL: CPT | Performed by: PHYSICAL MEDICINE & REHABILITATION

## 2024-01-04 PROCEDURE — 64635 DESTROY LUMB/SAC FACET JNT: CPT | Performed by: PHYSICAL MEDICINE & REHABILITATION

## 2024-01-04 RX ORDER — TRIAMCINOLONE ACETONIDE 40 MG/ML
40 INJECTION, SUSPENSION INTRA-ARTICULAR; INTRAMUSCULAR ONCE
Status: COMPLETED | OUTPATIENT
Start: 2024-01-04 | End: 2024-01-04

## 2024-01-04 RX ADMIN — TRIAMCINOLONE ACETONIDE 40 MG: 40 INJECTION, SUSPENSION INTRA-ARTICULAR; INTRAMUSCULAR at 16:06

## 2024-09-03 ENCOUNTER — TELEPHONE (OUTPATIENT)
Age: 84
End: 2024-09-03

## 2024-09-04 ENCOUNTER — OFFICE VISIT (OUTPATIENT)
Age: 84
End: 2024-09-04
Payer: MEDICARE

## 2024-09-04 VITALS
SYSTOLIC BLOOD PRESSURE: 118 MMHG | HEIGHT: 73 IN | DIASTOLIC BLOOD PRESSURE: 72 MMHG | HEART RATE: 53 BPM | BODY MASS INDEX: 26.21 KG/M2 | WEIGHT: 197.8 LBS

## 2024-09-04 DIAGNOSIS — I25.83 CORONARY ARTERY DISEASE DUE TO LIPID RICH PLAQUE: ICD-10-CM

## 2024-09-04 DIAGNOSIS — G47.33 OSA (OBSTRUCTIVE SLEEP APNEA): ICD-10-CM

## 2024-09-04 DIAGNOSIS — I25.10 CORONARY ARTERY DISEASE DUE TO LIPID RICH PLAQUE: ICD-10-CM

## 2024-09-04 DIAGNOSIS — R00.2 PALPITATIONS: ICD-10-CM

## 2024-09-04 DIAGNOSIS — E78.5 DYSLIPIDEMIA: ICD-10-CM

## 2024-09-04 DIAGNOSIS — I10 ESSENTIAL HYPERTENSION: Primary | ICD-10-CM

## 2024-09-04 PROCEDURE — 3074F SYST BP LT 130 MM HG: CPT | Performed by: INTERNAL MEDICINE

## 2024-09-04 PROCEDURE — 1123F ACP DISCUSS/DSCN MKR DOCD: CPT | Performed by: INTERNAL MEDICINE

## 2024-09-04 PROCEDURE — 99215 OFFICE O/P EST HI 40 MIN: CPT | Performed by: INTERNAL MEDICINE

## 2024-09-04 PROCEDURE — 3078F DIAST BP <80 MM HG: CPT | Performed by: INTERNAL MEDICINE

## 2024-09-04 PROCEDURE — 1036F TOBACCO NON-USER: CPT | Performed by: INTERNAL MEDICINE

## 2024-09-04 PROCEDURE — G8428 CUR MEDS NOT DOCUMENT: HCPCS | Performed by: INTERNAL MEDICINE

## 2024-09-04 PROCEDURE — 93000 ELECTROCARDIOGRAM COMPLETE: CPT | Performed by: INTERNAL MEDICINE

## 2024-09-04 PROCEDURE — G8417 CALC BMI ABV UP PARAM F/U: HCPCS | Performed by: INTERNAL MEDICINE

## 2024-09-04 RX ORDER — ROSUVASTATIN CALCIUM 10 MG/1
10 TABLET, COATED ORAL DAILY
COMMUNITY
End: 2024-09-04 | Stop reason: SDUPTHER

## 2024-09-04 RX ORDER — ASPIRIN 81 MG/1
1 TABLET, CHEWABLE ORAL DAILY
COMMUNITY
Start: 2019-06-14 | End: 2024-09-04 | Stop reason: SDUPTHER

## 2024-09-04 RX ORDER — ROSUVASTATIN CALCIUM 10 MG/1
10 TABLET, COATED ORAL DAILY
Qty: 90 TABLET | Refills: 3 | Status: SHIPPED | OUTPATIENT
Start: 2024-09-04

## 2024-09-04 RX ORDER — ASPIRIN 81 MG/1
81 TABLET, CHEWABLE ORAL DAILY
Qty: 90 TABLET | Refills: 3 | Status: SHIPPED | OUTPATIENT
Start: 2024-09-04

## 2024-09-04 NOTE — PROGRESS NOTES
List Items Addressed This Visit    None     Bradycardia  - EKG today shows sinus bradycardia    CAD  - The current medical regimen is effective; continue present plan and medications.    HTN  - BP today of 118/72  - Continue lisinopril    HLD  - Last lipid panel 8/24  - LDL 54  - Continue rosuvastatin 20mg    Cryptogenic TIA  Workup usually indicates placement of an implantable loop recorder    Extensive discussion about loop recorder took place    Patient presents with a relevant past medical history as delineated in this note which could be inclusive of hypertension obesity and/or heart failure.  Patient presents now with new onset symptoms suggestive of obstructive and/or central sleep apnea over the past several months.  The patient reports progressively worsening symptoms including excessive daytime sleepiness morning headaches and/or persistent fatigue despite obtaining what they believed to be adequate sleep.  Patient's partner has observed loud snoring choking and or gasping episodes during the night as well as periods of apparent cessation of breathing lasting up to several seconds    Patient also reports waking up multiple times per night with a sensation of shortness of breath and has noticed increasing difficulty staying asleep with frequent awakenings.  Despite efforts to improve her sleep hygiene including maintenance of regular sleep schedule and or reducing caffeine intake there has been no improvement in symptoms    Patient is also noticed a decline in cognitive function with increased forgetfulness difficulty concentrating and/or irritability which is affecting daily activities and work performance.  The patient denies significant nasal congestion but reports occasional waking with dry mouth and/or sore throat    Given the patient's history there is a high suspicion for both obstructive and/or central sleep apnea.  The patient's cardiovascular risk may be further compounded by the presence of

## 2024-11-12 ENCOUNTER — HOSPITAL ENCOUNTER (OUTPATIENT)
Dept: SLEEP MEDICINE | Age: 84
Discharge: HOME | End: 2024-11-15
Payer: MEDICARE

## 2024-11-12 PROCEDURE — 95810 POLYSOM 6/> YRS 4/> PARAM: CPT

## 2024-11-27 ENCOUNTER — TELEPHONE (OUTPATIENT)
Age: 84
End: 2024-11-27

## 2024-11-27 NOTE — TELEPHONE ENCOUNTER
Pt is calling about his sleep study,pt said they are not in my chart and would like nurse to call him because he needs to find out about that test and make sure his other doctor who he has apt with next week can see it , Please call pt [phone]  Sleep study done 09-4-24

## 2024-12-17 ENCOUNTER — TELEPHONE (OUTPATIENT)
Dept: SLEEP MEDICINE | Age: 84
End: 2024-12-17

## 2025-01-08 NOTE — PROGRESS NOTES
discussed. Specifically, the increased incidence of hypertension, coronary artery disease, congestive heart failure, pulmonary hypertension, gastroesophageal reflux, pathologic hypersomnolence, memory loss, and glucose intolerance was related to the consequences of hypoxemia, hypercapnia, airway obstruction, and sympathetic overdrive.  We also discussed the ability of nasal CPAP to correct these abnormalities through maintenance of a patent airway.  Therapeutic options including surgery, oral appliances, and weight loss were also reviewed.   G47.33 External Referral To Dentistry      2. Nocturnal hypoxemia , this is mild and related to sleep apnea and should improve with appropriate treatment G47.34 External Referral To Dentistry      3. PLMD (periodic limb movement disorder) , associated with minimal arousal no sign of restless leg to warrant further treatment at this time G47.61              PLAN:    Discussed pathophysiology of sleep apnea and different treatment option in detail as noted above.  Patient is going to let me know with his final decision about proper treatment    Proper sleep hygiene and positional therapy are recommended    Appropriate handout regarding sleep hygiene and sleep education will be provided    Maintain his follow-up with primary care and cardiology    He will return to the sleep center in 5 months or sooner if needed    Discussed with him and his wife or his concern and his study in detail and reviewed treatment option at length    Start Astelin nasal spray to help with nasal allergy    Change Flonase to Nasonex nasal spray daily    Recommend to try the Mute nasal dilator to purchase on [employer] or his local pharmacy to help with his nasal congestion at nighttime and minimize his awakening      Orders Placed This Encounter   Procedures    External Referral To Dentistry     Referral Priority:   Urgent     Referral Type:   Eval and Treat     Referral Reason:   Specialty Services Required

## 2025-01-09 ENCOUNTER — OFFICE VISIT (OUTPATIENT)
Dept: SLEEP MEDICINE | Age: 85
End: 2025-01-09

## 2025-01-09 VITALS
WEIGHT: 201 LBS | OXYGEN SATURATION: 99 % | RESPIRATION RATE: 14 BRPM | SYSTOLIC BLOOD PRESSURE: 128 MMHG | DIASTOLIC BLOOD PRESSURE: 72 MMHG | BODY MASS INDEX: 26.64 KG/M2 | HEART RATE: 72 BPM | HEIGHT: 73 IN

## 2025-01-09 DIAGNOSIS — G47.33 OSA (OBSTRUCTIVE SLEEP APNEA): Primary | ICD-10-CM

## 2025-01-09 DIAGNOSIS — G47.61 PLMD (PERIODIC LIMB MOVEMENT DISORDER): ICD-10-CM

## 2025-01-09 DIAGNOSIS — G47.34 NOCTURNAL HYPOXEMIA: ICD-10-CM

## 2025-01-09 RX ORDER — AZELASTINE 1 MG/ML
1 SPRAY, METERED NASAL 2 TIMES DAILY
Qty: 1 EACH | Refills: 11 | Status: SHIPPED | OUTPATIENT
Start: 2025-01-09

## 2025-01-09 RX ORDER — TRIAMCINOLONE ACETONIDE 55 UG/1
2 SPRAY, METERED NASAL DAILY
Qty: 1 EACH | Refills: 11 | Status: SHIPPED | OUTPATIENT
Start: 2025-01-09

## 2025-01-09 NOTE — PATIENT INSTRUCTIONS
school.    Depression, anxiety, and other mental or emotional conditions.    Changes in your sleep habits or surroundings.  This includes changes that happen where you sleep, such as noise, light, or sleeping in a different bed.  It also includes changes in your sleep pattern, such as having jet lab or working a late shift.    Health problems, such as pain, breathing problems, and restless legs syndrome.    Lack of regular exercise.    How can you help yourself?    Here are some tips that may help you sleep more soundly and wake up feeling more refreshed.    Your sleeping area    Use your bedroom only for sleeping and sex.  A bit of light reading may help you fall asleep.  But if it doesn't, do your reading elsewhere in the house.  Don't watch TV in bed.    Be sure your bed is big enough to stretch out comfortable, especially if you have a sleep partner.    Keep your bedroom quiet, dark, and cool.  Use curtains, blinds, or sleep mask to block out light.  To block out noise, use earplugs, soothing music, or a \"white noise\" machine.     Your evening and bedtime routine    Create a relaxing bedtime routine.  You might want to take a warm shower or bath, listen to soothing music, or drink a cup of non-caffeinated tea.      Go to bed at the same time every night.  And get up at the same time every morning, even if you feel tired.    What to avoid:    Limit caffeine (coffee, tea, caffeinated sodas) during the day, and dont have any for at least 4 to 6 hours before bedtime.    Don't drink alcohol before bedtime.  Alcohol can cause you to wake up more often during the night.    Don't smoke or use tobacco, especially in the evening.  Nicotine can keep you awake.     Don't like in bed away for too long.  If you can't fall asleep, or if you wake up in the middle of the night and can't get back to sleep within 15 minutes or so, get out of bed and go to another room until you feel sleepy.    Don't take medicine right before bed

## 2025-01-21 ENCOUNTER — TELEPHONE (OUTPATIENT)
Dept: SLEEP MEDICINE | Age: 85
End: 2025-01-21

## 2025-01-21 NOTE — TELEPHONE ENCOUNTER
Called patient to let him know we had to r/s due to the provider being out, left info for new appointment. Sending letter as well with new appointment details

## 2025-06-30 ASSESSMENT — SLEEP AND FATIGUE QUESTIONNAIRES
HOW LIKELY ARE YOU TO NOD OFF OR FALL ASLEEP WHILE WATCHING TV: WOULD NEVER DOZE
HOW LIKELY ARE YOU TO NOD OFF OR FALL ASLEEP IN A CAR, WHILE STOPPED FOR A FEW MINUTES IN TRAFFIC: WOULD NEVER DOZE
HOW LIKELY ARE YOU TO NOD OFF OR FALL ASLEEP WHILE SITTING AND TALKING TO SOMEONE: WOULD NEVER DOZE
HOW LIKELY ARE YOU TO NOD OFF OR FALL ASLEEP WHILE SITTING QUIETLY AFTER LUNCH WITHOUT ALCOHOL: WOULD NEVER DOZE
ESS TOTAL SCORE: 0
HOW LIKELY ARE YOU TO NOD OFF OR FALL ASLEEP WHEN YOU ARE A PASSENGER IN A CAR FOR AN HOUR WITHOUT A BREAK: WOULD NEVER DOZE
HOW LIKELY ARE YOU TO NOD OFF OR FALL ASLEEP WHILE LYING DOWN TO REST IN THE AFTERNOON WHEN CIRCUMSTANCES PERMIT: WOULD NEVER DOZE
HOW LIKELY ARE YOU TO NOD OFF OR FALL ASLEEP WHILE SITTING INACTIVE IN A PUBLIC PLACE: WOULD NEVER DOZE
HOW LIKELY ARE YOU TO NOD OFF OR FALL ASLEEP WHILE WATCHING TV: WOULD NEVER DOZE
HOW LIKELY ARE YOU TO NOD OFF OR FALL ASLEEP WHILE SITTING INACTIVE IN A PUBLIC PLACE: WOULD NEVER DOZE
HOW LIKELY ARE YOU TO NOD OFF OR FALL ASLEEP WHILE SITTING AND READING: WOULD NEVER DOZE
HOW LIKELY ARE YOU TO NOD OFF OR FALL ASLEEP WHILE SITTING QUIETLY AFTER LUNCH WITHOUT ALCOHOL: WOULD NEVER DOZE
HOW LIKELY ARE YOU TO NOD OFF OR FALL ASLEEP IN A CAR, WHILE STOPPED FOR A FEW MINUTES IN TRAFFIC: WOULD NEVER DOZE
HOW LIKELY ARE YOU TO NOD OFF OR FALL ASLEEP WHILE SITTING AND TALKING TO SOMEONE: WOULD NEVER DOZE
HOW LIKELY ARE YOU TO NOD OFF OR FALL ASLEEP WHEN YOU ARE A PASSENGER IN A CAR FOR AN HOUR WITHOUT A BREAK: WOULD NEVER DOZE
HOW LIKELY ARE YOU TO NOD OFF OR FALL ASLEEP WHILE SITTING AND READING: WOULD NEVER DOZE
HOW LIKELY ARE YOU TO NOD OFF OR FALL ASLEEP WHILE LYING DOWN TO REST IN THE AFTERNOON WHEN CIRCUMSTANCES PERMIT: WOULD NEVER DOZE

## 2025-07-02 NOTE — PROGRESS NOTES
ecchymosis present.   EXTREMITIES:   The extremities are unremarkable without clubbing, cyanosis, joint inflammation, degenerative, or ischemic change.   MUSCULOSKELETAL:   There is no abnormal tone, muscle atrophy, or abnormal movement present.   NEURO:   The patient is alert and oriented to person, place, and time.  Memory appears intact and mood is normal.  No gross sensorimotor deficits are present.      DIAGNOSTIC TESTS:  Npsg  11/12/24           ASSESSMENT:  (Medical Decision Making)         ICD-10-CM    1. WILLIS (obstructive sleep apnea), mild and associated with upper airway resistance syndrome     The patient currently using an oral appliance and this will be continued to be adjusted until maximal tolerated level   G47.33 External Referral To Dentistry      2. Nocturnal hypoxemia , this is mild and related to sleep apnea and should improve with appropriate treatment G47.34 External Referral To Dentistry      3. PLMD (periodic limb movement disorder) , associated with minimal arousal no sign of restless leg to warrant further treatment at this time G47.61              PLAN:    Continue to use his oral appliance as recommended and make proper adjustment by his dentist.  Will repeat HST at some point once we know he reached the optimal setting    Proper sleep hygiene and positional therapy are recommended    Appropriate handout regarding sleep hygiene and sleep education will be provided    Maintain his follow-up with primary care and cardiology    He will return to the sleep center in 54months or sooner if needed    Continue all recommendation discussed previously including the Mute nasal dilator to help with his nasal congestion at nighttime and minimize his awakening      Over 50% of today's office visit was spent in face to face time reviewing test results, prognosis, importance of compliance, education about disease process, benefits of medications, instructions for management of acute flare-ups, and follow

## 2025-07-03 ENCOUNTER — OFFICE VISIT (OUTPATIENT)
Dept: SLEEP MEDICINE | Age: 85
End: 2025-07-03
Payer: MEDICARE

## 2025-07-03 VITALS
RESPIRATION RATE: 14 BRPM | WEIGHT: 184 LBS | HEART RATE: 45 BPM | DIASTOLIC BLOOD PRESSURE: 64 MMHG | BODY MASS INDEX: 24.39 KG/M2 | OXYGEN SATURATION: 97 % | SYSTOLIC BLOOD PRESSURE: 118 MMHG | HEIGHT: 73 IN

## 2025-07-03 DIAGNOSIS — G47.61 PLMD (PERIODIC LIMB MOVEMENT DISORDER): ICD-10-CM

## 2025-07-03 DIAGNOSIS — G47.33 OSA (OBSTRUCTIVE SLEEP APNEA): Primary | ICD-10-CM

## 2025-07-03 DIAGNOSIS — G47.34 NOCTURNAL HYPOXEMIA: ICD-10-CM

## 2025-07-03 PROCEDURE — 1036F TOBACCO NON-USER: CPT | Performed by: INTERNAL MEDICINE

## 2025-07-03 PROCEDURE — G2211 COMPLEX E/M VISIT ADD ON: HCPCS | Performed by: INTERNAL MEDICINE

## 2025-07-03 PROCEDURE — 1159F MED LIST DOCD IN RCRD: CPT | Performed by: INTERNAL MEDICINE

## 2025-07-03 PROCEDURE — G8427 DOCREV CUR MEDS BY ELIG CLIN: HCPCS | Performed by: INTERNAL MEDICINE

## 2025-07-03 PROCEDURE — 1123F ACP DISCUSS/DSCN MKR DOCD: CPT | Performed by: INTERNAL MEDICINE

## 2025-07-03 PROCEDURE — 99214 OFFICE O/P EST MOD 30 MIN: CPT | Performed by: INTERNAL MEDICINE

## 2025-07-03 PROCEDURE — G8420 CALC BMI NORM PARAMETERS: HCPCS | Performed by: INTERNAL MEDICINE

## 2025-07-03 PROCEDURE — 1160F RVW MEDS BY RX/DR IN RCRD: CPT | Performed by: INTERNAL MEDICINE
